# Patient Record
Sex: FEMALE | Race: WHITE | NOT HISPANIC OR LATINO | Employment: FULL TIME | URBAN - NONMETROPOLITAN AREA
[De-identification: names, ages, dates, MRNs, and addresses within clinical notes are randomized per-mention and may not be internally consistent; named-entity substitution may affect disease eponyms.]

---

## 2023-05-12 ENCOUNTER — HOSPITAL ENCOUNTER (EMERGENCY)
Facility: HOSPITAL | Age: 43
Discharge: HOME | End: 2023-05-13
Attending: EMERGENCY MEDICINE
Payer: OTHER GOVERNMENT

## 2023-05-12 DIAGNOSIS — G43.909 MIGRAINE WITHOUT STATUS MIGRAINOSUS, NOT INTRACTABLE, UNSPECIFIED MIGRAINE TYPE: Primary | ICD-10-CM

## 2023-05-12 LAB
ALBUMIN SERPL-MCNC: 4.4 G/DL (ref 3.5–5.2)
ALBUMIN/GLOB SERPL: 1.3 G/DL
ALP SERPL-CCNC: 86 U/L (ref 39–117)
ALT SERPL W P-5'-P-CCNC: 13 U/L (ref 1–33)
ANION GAP SERPL CALCULATED.3IONS-SCNC: 12.8 MMOL/L (ref 5–15)
AST SERPL-CCNC: 18 U/L (ref 1–32)
BASOPHILS # BLD AUTO: 0.05 10*3/MM3 (ref 0–0.2)
BASOPHILS NFR BLD AUTO: 0.7 % (ref 0–1.5)
BILIRUB SERPL-MCNC: 0.2 MG/DL (ref 0–1.2)
BUN SERPL-MCNC: 11 MG/DL (ref 6–20)
BUN/CREAT SERPL: 12.1 (ref 7–25)
CALCIUM SPEC-SCNC: 8.9 MG/DL (ref 8.6–10.5)
CHLORIDE SERPL-SCNC: 101 MMOL/L (ref 98–107)
CO2 SERPL-SCNC: 23.2 MMOL/L (ref 22–29)
CREAT SERPL-MCNC: 0.91 MG/DL (ref 0.57–1)
DEPRECATED RDW RBC AUTO: 44.4 FL (ref 37–54)
EGFRCR SERPLBLD CKD-EPI 2021: 80.9 ML/MIN/1.73
EOSINOPHIL # BLD AUTO: 0.38 10*3/MM3 (ref 0–0.4)
EOSINOPHIL NFR BLD AUTO: 5 % (ref 0.3–6.2)
ERYTHROCYTE [DISTWIDTH] IN BLOOD BY AUTOMATED COUNT: 18.2 % (ref 12.3–15.4)
GLOBULIN UR ELPH-MCNC: 3.4 GM/DL
GLUCOSE SERPL-MCNC: 89 MG/DL (ref 65–99)
HCT VFR BLD AUTO: 35.6 % (ref 34–46.6)
HGB BLD-MCNC: 10.8 G/DL (ref 12–15.9)
HOLD SPECIMEN: NORMAL
HOLD SPECIMEN: NORMAL
IMM GRANULOCYTES # BLD AUTO: 0.02 10*3/MM3 (ref 0–0.05)
IMM GRANULOCYTES NFR BLD AUTO: 0.3 % (ref 0–0.5)
LYMPHOCYTES # BLD AUTO: 2.45 10*3/MM3 (ref 0.7–3.1)
LYMPHOCYTES NFR BLD AUTO: 31.9 % (ref 19.6–45.3)
MCH RBC QN AUTO: 20.7 PG (ref 26.6–33)
MCHC RBC AUTO-ENTMCNC: 30.3 G/DL (ref 31.5–35.7)
MCV RBC AUTO: 68.1 FL (ref 79–97)
MONOCYTES # BLD AUTO: 0.75 10*3/MM3 (ref 0.1–0.9)
MONOCYTES NFR BLD AUTO: 9.8 % (ref 5–12)
NEUTROPHILS NFR BLD AUTO: 4.02 10*3/MM3 (ref 1.7–7)
NEUTROPHILS NFR BLD AUTO: 52.3 % (ref 42.7–76)
NRBC BLD AUTO-RTO: 0 /100 WBC (ref 0–0.2)
PLATELET # BLD AUTO: 335 10*3/MM3 (ref 140–450)
PMV BLD AUTO: 9.6 FL (ref 6–12)
POTASSIUM SERPL-SCNC: 4 MMOL/L (ref 3.5–5.2)
PROT SERPL-MCNC: 7.8 G/DL (ref 6–8.5)
RBC # BLD AUTO: 5.23 10*6/MM3 (ref 3.77–5.28)
SODIUM SERPL-SCNC: 137 MMOL/L (ref 136–145)
WBC NRBC COR # BLD: 7.67 10*3/MM3 (ref 3.4–10.8)
WHOLE BLOOD HOLD COAG: NORMAL
WHOLE BLOOD HOLD SPECIMEN: NORMAL

## 2023-05-12 PROCEDURE — 25010000002 DIPHENHYDRAMINE PER 50 MG: Performed by: PHYSICIAN ASSISTANT

## 2023-05-12 PROCEDURE — 99284 EMERGENCY DEPT VISIT MOD MDM: CPT

## 2023-05-12 PROCEDURE — 80053 COMPREHEN METABOLIC PANEL: CPT | Performed by: PHYSICIAN ASSISTANT

## 2023-05-12 PROCEDURE — 25010000002 METOCLOPRAMIDE PER 10 MG: Performed by: PHYSICIAN ASSISTANT

## 2023-05-12 PROCEDURE — 85025 COMPLETE CBC W/AUTO DIFF WBC: CPT | Performed by: PHYSICIAN ASSISTANT

## 2023-05-12 PROCEDURE — 25010000002 KETOROLAC TROMETHAMINE PER 15 MG: Performed by: PHYSICIAN ASSISTANT

## 2023-05-12 PROCEDURE — 96374 THER/PROPH/DIAG INJ IV PUSH: CPT

## 2023-05-12 PROCEDURE — 96375 TX/PRO/DX INJ NEW DRUG ADDON: CPT

## 2023-05-12 RX ORDER — KETOROLAC TROMETHAMINE 30 MG/ML
15 INJECTION, SOLUTION INTRAMUSCULAR; INTRAVENOUS ONCE
Status: COMPLETED | OUTPATIENT
Start: 2023-05-12 | End: 2023-05-12

## 2023-05-12 RX ORDER — DIPHENHYDRAMINE HYDROCHLORIDE 50 MG/ML
25 INJECTION INTRAMUSCULAR; INTRAVENOUS ONCE
Status: COMPLETED | OUTPATIENT
Start: 2023-05-12 | End: 2023-05-12

## 2023-05-12 RX ORDER — SODIUM CHLORIDE 0.9 % (FLUSH) 0.9 %
10 SYRINGE (ML) INJECTION AS NEEDED
Status: DISCONTINUED | OUTPATIENT
Start: 2023-05-12 | End: 2023-05-13 | Stop reason: HOSPADM

## 2023-05-12 RX ORDER — METOCLOPRAMIDE HYDROCHLORIDE 5 MG/ML
10 INJECTION INTRAMUSCULAR; INTRAVENOUS ONCE
Status: COMPLETED | OUTPATIENT
Start: 2023-05-12 | End: 2023-05-12

## 2023-05-12 RX ORDER — OMEPRAZOLE 20 MG/1
20 CAPSULE, DELAYED RELEASE ORAL DAILY
COMMUNITY

## 2023-05-12 RX ADMIN — METOCLOPRAMIDE 10 MG: 5 INJECTION, SOLUTION INTRAMUSCULAR; INTRAVENOUS at 22:42

## 2023-05-12 RX ADMIN — SODIUM CHLORIDE 1000 ML: 9 INJECTION, SOLUTION INTRAVENOUS at 22:42

## 2023-05-12 RX ADMIN — KETOROLAC TROMETHAMINE 15 MG: 30 INJECTION, SOLUTION INTRAMUSCULAR; INTRAVENOUS at 22:42

## 2023-05-12 RX ADMIN — DIPHENHYDRAMINE HYDROCHLORIDE 25 MG: 50 INJECTION, SOLUTION INTRAMUSCULAR; INTRAVENOUS at 22:42

## 2023-05-13 VITALS
TEMPERATURE: 98.3 F | SYSTOLIC BLOOD PRESSURE: 120 MMHG | HEART RATE: 74 BPM | WEIGHT: 150 LBS | OXYGEN SATURATION: 100 % | HEIGHT: 63 IN | DIASTOLIC BLOOD PRESSURE: 80 MMHG | RESPIRATION RATE: 18 BRPM | BODY MASS INDEX: 26.58 KG/M2

## 2023-05-13 NOTE — ED PROVIDER NOTES
"Subjective:  Chief Complaint:  Headache    History of Present Illness:  Patient is a 42-year-old female with history of migraines who was recently started on Imitrex presenting to the ER with complaints of a migraine that started around 6:00 tonight while driving to work.  She states it started out with her left eye hurting and then spread to the entire left side of her head.  She states she thought it was one of her migraines so she took Imitrex but states that she felt shaky and nauseous afterwards and it did not help her headache.  She denies any additional medications prior to arrival.  She denies any additional symptoms or complaints at this time.  Patient is adamant that she is not pregnant.      Nurses Notes reviewed and agree, including vitals, allergies, social history and prior medical history.     REVIEW OF SYSTEMS: All systems reviewed and not pertinent unless noted.  Review of Systems   Gastrointestinal: Positive for nausea.   Neurological: Positive for headaches.   All other systems reviewed and are negative.      Past Medical History:   Diagnosis Date   • GERD (gastroesophageal reflux disease)    • Hx of blood clots        Allergies:    Patient has no known allergies.      History reviewed. No pertinent surgical history.      Social History     Socioeconomic History   • Marital status:    Tobacco Use   • Smoking status: Former     Types: Cigarettes   • Smokeless tobacco: Never   Vaping Use   • Vaping Use: Never used   Substance and Sexual Activity   • Drug use: Never   • Sexual activity: Defer         History reviewed. No pertinent family history.    Objective  Physical Exam:  /73   Pulse 71   Temp 98.3 °F (36.8 °C)   Resp 18   Ht 160 cm (63\")   Wt 68 kg (150 lb)   SpO2 98%   BMI 26.57 kg/m²      Physical Exam  Vitals and nursing note reviewed.   Constitutional:       General: She is not in acute distress.     Appearance: She is not toxic-appearing.   HENT:      Head: Normocephalic " and atraumatic.      Right Ear: External ear normal.      Left Ear: External ear normal.      Nose: Nose normal.   Eyes:      Extraocular Movements: Extraocular movements intact.      Conjunctiva/sclera: Conjunctivae normal.      Pupils: Pupils are equal, round, and reactive to light.   Cardiovascular:      Rate and Rhythm: Normal rate.   Pulmonary:      Effort: Pulmonary effort is normal. No respiratory distress.   Abdominal:      General: There is no distension.      Palpations: Abdomen is soft.   Musculoskeletal:         General: Normal range of motion.      Cervical back: Normal range of motion and neck supple.   Skin:     General: Skin is warm and dry.   Neurological:      General: No focal deficit present.      Mental Status: She is alert and oriented to person, place, and time.   Psychiatric:         Mood and Affect: Mood normal.         Behavior: Behavior normal.         Procedures    ED Course:         Lab Results (last 24 hours)     Procedure Component Value Units Date/Time    CBC & Differential [[id number]]  (Abnormal) Collected: 05/12/23 2157    Specimen: Blood Updated: 05/12/23 2231    Narrative:      The following orders were created for panel order CBC & Differential.  Procedure                               Abnormality         Status                     ---------                               -----------         ------                     CBC Auto Differential[[id number]]        Abnormal            Final result                 Please view results for these tests on the individual orders.    Comprehensive Metabolic Panel [[id number]] Collected: 05/12/23 2157    Specimen: Blood Updated: 05/12/23 2234     Glucose 89 mg/dL      BUN 11 mg/dL      Creatinine 0.91 mg/dL      Sodium 137 mmol/L      Potassium 4.0 mmol/L      Chloride 101 mmol/L      CO2 23.2 mmol/L      Calcium 8.9 mg/dL      Total Protein 7.8 g/dL      Albumin 4.4 g/dL      ALT (SGPT) 13 U/L      AST (SGOT) 18 U/L      Alkaline Phosphatase 86  U/L      Total Bilirubin 0.2 mg/dL      Globulin 3.4 gm/dL      A/G Ratio 1.3 g/dL      BUN/Creatinine Ratio 12.1     Anion Gap 12.8 mmol/L      eGFR 80.9 mL/min/1.73     Narrative:      GFR Normal >60  Chronic Kidney Disease <60  Kidney Failure <15      CBC Auto Differential [[id number]]  (Abnormal) Collected: 05/12/23 2157    Specimen: Blood Updated: 05/12/23 2231     WBC 7.67 10*3/mm3      RBC 5.23 10*6/mm3      Hemoglobin 10.8 g/dL      Hematocrit 35.6 %      MCV 68.1 fL      MCH 20.7 pg      MCHC 30.3 g/dL      RDW 18.2 %      RDW-SD 44.4 fl      MPV 9.6 fL      Platelets 335 10*3/mm3      Neutrophil % 52.3 %      Lymphocyte % 31.9 %      Monocyte % 9.8 %      Eosinophil % 5.0 %      Basophil % 0.7 %      Immature Grans % 0.3 %      Neutrophils, Absolute 4.02 10*3/mm3      Lymphocytes, Absolute 2.45 10*3/mm3      Monocytes, Absolute 0.75 10*3/mm3      Eosinophils, Absolute 0.38 10*3/mm3      Basophils, Absolute 0.05 10*3/mm3      Immature Grans, Absolute 0.02 10*3/mm3      nRBC 0.0 /100 WBC            No radiology results from the last 24 hrs       MDM  Patient was evaluated in the ER for left-sided headache that started around 6:00 tonight while she was driving.  She is hemodynamically stable, afebrile, nontoxic-appearing on exam.  Differential diagnosis includes but is not limited to tension headache, migraine, cluster headache, among others.  Initial plan includes CBC, CMP, treatment with a migraine cocktail including Reglan, Benadryl, IV fluids, and Toradol.  Patient is adamant that she is not pregnant.  Patient does have history of migraines and was recently started on Imitrex.  She took a dose of this today but states that it made her symptoms worse as it made her feel shaky and nauseous and did not relieve her headache.    Labs are unremarkable.  Patient feels much better after migraine cocktail.  She states she is just sleepy but headache is gone.  She is agreeable with plan for discharge.  She was given  follow-up information for neurology for further outpatient evaluation and definitive care of migraines.  She was advised to follow-up with her PCP as needed.  Precautions were given for return to the ER for any new or worsening symptoms.    Final diagnoses:   Migraine without status migrainosus, not intractable, unspecified migraine type        Geeta Moon PA-C  05/13/23 0010

## 2023-05-13 NOTE — DISCHARGE INSTRUCTIONS
Take Excedrin Migraine as directed on the package as needed for headache/migraine.  Drink plenty of water.  Follow-up with your PCP for further outpatient evaluation as needed.  Follow-up with neurology for further outpatient evaluation and definitive care of migraines.  Return to the ER for any new or worsening symptoms or acute concerns.

## 2023-10-19 ENCOUNTER — OFFICE VISIT (OUTPATIENT)
Dept: OBSTETRICS AND GYNECOLOGY | Facility: CLINIC | Age: 43
End: 2023-10-19
Payer: OTHER GOVERNMENT

## 2023-10-19 VITALS
DIASTOLIC BLOOD PRESSURE: 82 MMHG | BODY MASS INDEX: 30.02 KG/M2 | SYSTOLIC BLOOD PRESSURE: 120 MMHG | WEIGHT: 169.4 LBS | HEIGHT: 63 IN

## 2023-10-19 DIAGNOSIS — N94.6 DYSMENORRHEA: Primary | ICD-10-CM

## 2023-10-19 DIAGNOSIS — R10.84 GENERALIZED ABDOMINAL PAIN: ICD-10-CM

## 2023-10-19 DIAGNOSIS — Z97.5 IUD (INTRAUTERINE DEVICE) IN PLACE: ICD-10-CM

## 2023-10-19 RX ORDER — PANTOPRAZOLE SODIUM 40 MG/1
40 TABLET, DELAYED RELEASE ORAL DAILY
COMMUNITY

## 2023-10-19 RX ORDER — SUMATRIPTAN 100 MG/1
100 TABLET, FILM COATED ORAL
COMMUNITY

## 2023-10-19 RX ORDER — CHOLECALCIFEROL (VITAMIN D3) 125 MCG
10 CAPSULE ORAL NIGHTLY
COMMUNITY

## 2023-10-19 RX ORDER — SUCRALFATE 1 G/1
1 TABLET ORAL 4 TIMES DAILY
COMMUNITY
Start: 2023-08-17 | End: 2024-08-16

## 2023-10-19 RX ORDER — FREMANEZUMAB-VFRM 225 MG/1.5ML
225 INJECTION SUBCUTANEOUS
COMMUNITY

## 2023-10-19 RX ORDER — FAMOTIDINE 20 MG/1
20 TABLET, FILM COATED ORAL
COMMUNITY
Start: 2023-08-17

## 2023-10-19 NOTE — PROGRESS NOTES
"GYN Office Visit    Subjective   Chief Complaint   Patient presents with    Abdominal Pain     New Patient here for evaluation of pelvic and perineal pain. TVS done today     Mary Ellen Guzman is a 43 y.o.  presenting to be evaluated for abdominal pain. She reports this has been ongoing for \"years.\" She recently saw a new PCP in 2023 for this concern. At that time, she reported \"dull throbbing ache, all over abdomen, constant.\" She also reported a history of suspected IBS-C. She has approximately 3 bowel movements per week. She also reported a longstanding history of GERD. She was started on pepcid and carafate and referred to GI for EGD/ colonoscopy, which she has scheduled for next month.    Mary Ellen reports her pain is affected by bowel movements - she is chronically constipated. She does report some pelvic cramping with menses. She has to stay home at times due to the pain - it can go into her legs and throughout her abdomen. She does have a copper IUD in place - it was placed in approximately  in [place]. She had the copper IUD placed due to malposition of a spiral intrauterine device (also placed in [place]). She does report having had a Mirena IUD previously that had to removed under anesthesia, though she is not sure why.     Prior to her IUD, her periods lasted 7-9 days and were much heavier. Now, they last 4-5 days and require changing pads q4 hours. They occur q24-26 days.     OB Hx:   OB History    Para Term  AB Living   5 5 5     5   SAB IAB Ectopic Molar Multiple Live Births             5      # Outcome Date GA Lbr Ramy/2nd Weight Sex Delivery Anes PTL Lv   5 Term      Vag-Spont   WILBERT   4 Term    4252 g (9 lb 6 oz)  Vag-Spont   WILBERT   3 Term      Vag-Spont   WILBERT   2 Term      Vag-Spont   WILBERT   1 Term      Vag-Spont   WILBERT      Obstetric Comments   Largest  4252g/ 9lb6oz      Pap smear: within the past month, not sure of results yet ([facility])  Mammogram: within the " "last year, normal  Colonoscopy: has had a couple, not sure of results. 10-20 years ago.  DEXA Scan: N/A    Past Medical History:   Diagnosis Date    Anemia     Anxiety     Asthma     Cirrhosis     Clotting disorder     Deep vein thrombosis     Depression     Disease of thyroid gland     Endometriosis     GERD (gastroesophageal reflux disease)     Hx of blood clots     Hyperlipidemia     Migraine     Ovarian cyst     Urinary tract infection      Past Surgical History:   Procedure Laterality Date    ABDOMINOPLASTY       Family History   Problem Relation Age of Onset    Hyperlipidemia Maternal Grandmother     Hyperlipidemia Maternal Grandfather         Not sure      Social History     Tobacco Use    Smoking status: Never    Smokeless tobacco: Never   Vaping Use    Vaping Use: Never used   Substance Use Topics    Alcohol use: Never    Drug use: Never     No Known Allergies    Current Outpatient Medications on File Prior to Visit   Medication Sig Dispense Refill    Ajovy 225 MG/1.5ML solution auto-injector Inject 225 mg under the skin into the appropriate area as directed Every 28 (Twenty-Eight) Days.      famotidine (PEPCID) 20 MG tablet Take 1 tablet by mouth.      pantoprazole (PROTONIX) 40 MG EC tablet Take 1 tablet by mouth Daily.      sucralfate (CARAFATE) 1 g tablet Take 1 tablet by mouth 4 (Four) Times a Day.      SUMAtriptan (IMITREX) 100 MG tablet Take 1 tablet by mouth.      melatonin 5 MG tablet tablet Take 2 tablets by mouth Every Night.      PARAGARD INTRAUTERINE COPPER IU 1 each by Intrauterine route 1 (One) Time.      [DISCONTINUED] omeprazole (priLOSEC) 20 MG capsule Take 1 capsule by mouth Daily. (Patient not taking: Reported on 10/19/2023)       No current facility-administered medications on file prior to visit.     Social History    Tobacco Use      Smoking status: Never      Smokeless tobacco: Never       Objective   /82   Ht 160 cm (63\")   Wt 76.8 kg (169 lb 6.4 oz)   LMP 09/25/2023 (Exact " Date)   BMI 30.01 kg/m²     Physical Exam:  General Appearance: alert, interactive, and NAD     Medical Decision Making:    I reviewed Mary Ellen's visit with her PCP, Dr. Rajan, on 8/17/23. I reviewed CT A/P results from 8/17/23. I reviewed, CBC, CMP, TSH, lipase, amylase, UA, lipid panel and A1c results from 8/17/23.    Independent interpretation of tests:  TVUS performed today -   Anteverted uterus measuring 10.2 x 7.8 x 6.4 cm without any masses seen. An intrauterine device is seen within the endometrial cavity and appears to be appropriately positioned. The endometrium is thickened, measuring up to 17 mm. The right ovary is normal in appearance. The left ovary contains a 1.7 cm hypoechoic cyst that appears consistent with an involuting follicle. The bilateral ovaries have normal vascular flow. No adnexal masses seen. No free fluid in the cul de sac.     Assessment & Plan      Diagnosis Plan   1. Dysmenorrhea  US Non-ob Transvaginal      2. IUD (intrauterine device) in place        3. Generalized abdominal pain           Medication Management: We discussed that the copper IUD can cause heavier/ more painful menses and that one management option to consider would be to remove the Paragard and replace with a progestin-based IUD.    Procedures Performed: None    We reviewed Mary Ellen's symptoms and ultrasound results. Her generalized abdominal pain does not seem GYN in origin but rather GI, and I encouraged continued follow up/ completion of upper and lower scopes per her PCP/ GI. She does have a history of dysmenorrhea and AUB, the latter of which is apparently worsened when she does not have any contraception on board. She is a little unsure of her menstrual/ contraceptive history but does seem to recall having much heavier, similarly painful periods prior to her current cycles. We discussed that her endometrium is thickened to 17 mm, which is not typical when progestin-based IUDs are used. We reviewed options to  remove the copper IUD and transition to a progestin-based IUD, or to consider endometrial ablation with the Cerene. Other management options include observation, alternative hormonal contraceptives, or hysterectomy. Mary Ellen would like to proceed with ablation via the Cerene. Prep for case placed and all questions answered.    RTC for post-operative assessment.    Kavitha Villarreal MD  Obstetrics and Gynecology  [facility]

## 2024-03-10 ENCOUNTER — HOSPITAL ENCOUNTER (EMERGENCY)
Facility: HOSPITAL | Age: 44
Discharge: HOME | End: 2024-03-10
Attending: STUDENT IN AN ORGANIZED HEALTH CARE EDUCATION/TRAINING PROGRAM | Admitting: STUDENT IN AN ORGANIZED HEALTH CARE EDUCATION/TRAINING PROGRAM
Payer: OTHER GOVERNMENT

## 2024-03-10 ENCOUNTER — APPOINTMENT (OUTPATIENT)
Dept: CT IMAGING | Facility: HOSPITAL | Age: 44
End: 2024-03-10
Payer: OTHER GOVERNMENT

## 2024-03-10 ENCOUNTER — APPOINTMENT (OUTPATIENT)
Dept: GENERAL RADIOLOGY | Facility: HOSPITAL | Age: 44
End: 2024-03-10
Payer: OTHER GOVERNMENT

## 2024-03-10 VITALS
HEIGHT: 63 IN | WEIGHT: 170 LBS | TEMPERATURE: 98.4 F | RESPIRATION RATE: 18 BRPM | SYSTOLIC BLOOD PRESSURE: 122 MMHG | OXYGEN SATURATION: 98 % | HEART RATE: 78 BPM | BODY MASS INDEX: 30.12 KG/M2 | DIASTOLIC BLOOD PRESSURE: 69 MMHG

## 2024-03-10 DIAGNOSIS — G43.809 OTHER MIGRAINE WITHOUT STATUS MIGRAINOSUS, NOT INTRACTABLE: Primary | ICD-10-CM

## 2024-03-10 LAB
ALBUMIN SERPL-MCNC: 4.6 G/DL (ref 3.5–5.2)
ALBUMIN/GLOB SERPL: 1.4 G/DL
ALP SERPL-CCNC: 87 U/L (ref 39–117)
ALT SERPL W P-5'-P-CCNC: 17 U/L (ref 1–33)
ANION GAP SERPL CALCULATED.3IONS-SCNC: 10.6 MMOL/L (ref 5–15)
AST SERPL-CCNC: 16 U/L (ref 1–32)
BASOPHILS # BLD AUTO: 0.04 10*3/MM3 (ref 0–0.2)
BASOPHILS NFR BLD AUTO: 0.5 % (ref 0–1.5)
BILIRUB SERPL-MCNC: 0.3 MG/DL (ref 0–1.2)
BUN SERPL-MCNC: 12 MG/DL (ref 6–20)
BUN/CREAT SERPL: 14.8 (ref 7–25)
CALCIUM SPEC-SCNC: 9.5 MG/DL (ref 8.6–10.5)
CHLORIDE SERPL-SCNC: 104 MMOL/L (ref 98–107)
CO2 SERPL-SCNC: 24.4 MMOL/L (ref 22–29)
CREAT SERPL-MCNC: 0.81 MG/DL (ref 0.57–1)
DEPRECATED RDW RBC AUTO: 39 FL (ref 37–54)
EGFRCR SERPLBLD CKD-EPI 2021: 92.5 ML/MIN/1.73
EOSINOPHIL # BLD AUTO: 0.31 10*3/MM3 (ref 0–0.4)
EOSINOPHIL NFR BLD AUTO: 3.6 % (ref 0.3–6.2)
ERYTHROCYTE [DISTWIDTH] IN BLOOD BY AUTOMATED COUNT: 13 % (ref 12.3–15.4)
GLOBULIN UR ELPH-MCNC: 3.4 GM/DL
GLUCOSE SERPL-MCNC: 87 MG/DL (ref 65–99)
HCT VFR BLD AUTO: 42.5 % (ref 34–46.6)
HGB BLD-MCNC: 14.7 G/DL (ref 12–15.9)
HOLD SPECIMEN: NORMAL
HOLD SPECIMEN: NORMAL
IMM GRANULOCYTES # BLD AUTO: 0.03 10*3/MM3 (ref 0–0.05)
IMM GRANULOCYTES NFR BLD AUTO: 0.3 % (ref 0–0.5)
LYMPHOCYTES # BLD AUTO: 2.11 10*3/MM3 (ref 0.7–3.1)
LYMPHOCYTES NFR BLD AUTO: 24.5 % (ref 19.6–45.3)
MCH RBC QN AUTO: 28.5 PG (ref 26.6–33)
MCHC RBC AUTO-ENTMCNC: 34.6 G/DL (ref 31.5–35.7)
MCV RBC AUTO: 82.5 FL (ref 79–97)
MONOCYTES # BLD AUTO: 0.66 10*3/MM3 (ref 0.1–0.9)
MONOCYTES NFR BLD AUTO: 7.7 % (ref 5–12)
NEUTROPHILS NFR BLD AUTO: 5.46 10*3/MM3 (ref 1.7–7)
NEUTROPHILS NFR BLD AUTO: 63.4 % (ref 42.7–76)
NRBC BLD AUTO-RTO: 0 /100 WBC (ref 0–0.2)
PLATELET # BLD AUTO: 266 10*3/MM3 (ref 140–450)
PMV BLD AUTO: 9.5 FL (ref 6–12)
POTASSIUM SERPL-SCNC: 4.1 MMOL/L (ref 3.5–5.2)
PROT SERPL-MCNC: 8 G/DL (ref 6–8.5)
RBC # BLD AUTO: 5.15 10*6/MM3 (ref 3.77–5.28)
SODIUM SERPL-SCNC: 139 MMOL/L (ref 136–145)
WBC NRBC COR # BLD AUTO: 8.61 10*3/MM3 (ref 3.4–10.8)
WHOLE BLOOD HOLD COAG: NORMAL
WHOLE BLOOD HOLD SPECIMEN: NORMAL

## 2024-03-10 PROCEDURE — 70450 CT HEAD/BRAIN W/O DYE: CPT

## 2024-03-10 PROCEDURE — 85025 COMPLETE CBC W/AUTO DIFF WBC: CPT

## 2024-03-10 PROCEDURE — 25010000002 KETOROLAC TROMETHAMINE PER 15 MG: Performed by: NURSE PRACTITIONER

## 2024-03-10 PROCEDURE — 80053 COMPREHEN METABOLIC PANEL: CPT

## 2024-03-10 PROCEDURE — 93005 ELECTROCARDIOGRAM TRACING: CPT

## 2024-03-10 PROCEDURE — 25010000002 MAGNESIUM SULFATE 2 GM/50ML SOLUTION: Performed by: NURSE PRACTITIONER

## 2024-03-10 PROCEDURE — 25010000002 DEXAMETHASONE SODIUM PHOSPHATE 10 MG/ML SOLUTION: Performed by: NURSE PRACTITIONER

## 2024-03-10 PROCEDURE — 25010000002 PROCHLORPERAZINE 10 MG/2ML SOLUTION: Performed by: NURSE PRACTITIONER

## 2024-03-10 PROCEDURE — 25810000003 SODIUM CHLORIDE 0.9 % SOLUTION: Performed by: NURSE PRACTITIONER

## 2024-03-10 PROCEDURE — 96365 THER/PROPH/DIAG IV INF INIT: CPT

## 2024-03-10 PROCEDURE — 96375 TX/PRO/DX INJ NEW DRUG ADDON: CPT

## 2024-03-10 PROCEDURE — 99284 EMERGENCY DEPT VISIT MOD MDM: CPT

## 2024-03-10 PROCEDURE — 71045 X-RAY EXAM CHEST 1 VIEW: CPT

## 2024-03-10 RX ORDER — KETOROLAC TROMETHAMINE 30 MG/ML
30 INJECTION, SOLUTION INTRAMUSCULAR; INTRAVENOUS ONCE
Status: COMPLETED | OUTPATIENT
Start: 2024-03-10 | End: 2024-03-10

## 2024-03-10 RX ORDER — DEXAMETHASONE SODIUM PHOSPHATE 10 MG/ML
10 INJECTION, SOLUTION INTRAMUSCULAR; INTRAVENOUS ONCE
Status: COMPLETED | OUTPATIENT
Start: 2024-03-10 | End: 2024-03-10

## 2024-03-10 RX ORDER — PROCHLORPERAZINE EDISYLATE 5 MG/ML
10 INJECTION INTRAMUSCULAR; INTRAVENOUS EVERY 6 HOURS PRN
Status: DISCONTINUED | OUTPATIENT
Start: 2024-03-10 | End: 2024-03-10 | Stop reason: HOSPADM

## 2024-03-10 RX ORDER — SODIUM CHLORIDE 0.9 % (FLUSH) 0.9 %
10 SYRINGE (ML) INJECTION AS NEEDED
Status: DISCONTINUED | OUTPATIENT
Start: 2024-03-10 | End: 2024-03-10 | Stop reason: HOSPADM

## 2024-03-10 RX ORDER — MAGNESIUM SULFATE HEPTAHYDRATE 40 MG/ML
2 INJECTION, SOLUTION INTRAVENOUS ONCE
Status: COMPLETED | OUTPATIENT
Start: 2024-03-10 | End: 2024-03-10

## 2024-03-10 RX ADMIN — KETOROLAC TROMETHAMINE 30 MG: 30 INJECTION, SOLUTION INTRAMUSCULAR; INTRAVENOUS at 12:48

## 2024-03-10 RX ADMIN — SODIUM CHLORIDE 1000 ML: 9 INJECTION, SOLUTION INTRAVENOUS at 12:47

## 2024-03-10 RX ADMIN — MAGNESIUM SULFATE HEPTAHYDRATE 2 G: 40 INJECTION, SOLUTION INTRAVENOUS at 12:48

## 2024-03-10 RX ADMIN — DEXAMETHASONE SODIUM PHOSPHATE 10 MG: 10 INJECTION INTRAMUSCULAR; INTRAVENOUS at 12:47

## 2024-03-10 RX ADMIN — PROCHLORPERAZINE EDISYLATE 10 MG: 5 INJECTION INTRAMUSCULAR; INTRAVENOUS at 13:04

## 2024-03-10 NOTE — ED PROVIDER NOTES
"Subjective:  History of Present Illness:    Patient is a 43-year-old female with history of migraine.  Presents to the ER today for bilateral upper and lower extremity weakness and blurry vision (with flashing lights) x 1 hour PTA.  She denies shortness of breath or chest pain.  She denies OTC medication home remedy.  Denies alleviating or exacerbating factors.    Nurses Notes reviewed and agree, including vitals, allergies, social history and prior medical history.     REVIEW OF SYSTEMS: All systems reviewed and not pertinent unless noted.  Review of Systems   Musculoskeletal:         Bilateral lower extremity weakness   Neurological:  Positive for headaches.   All other systems reviewed and are negative.      Past Medical History:   Diagnosis Date    Anemia     Anxiety     Asthma     Cirrhosis     Clotting disorder     Deep vein thrombosis     Depression     Disease of thyroid gland     Endometriosis     GERD (gastroesophageal reflux disease)     Hx of blood clots     Hyperlipidemia     Migraine     Ovarian cyst     Urinary tract infection        Allergies:    Patient has no known allergies.      Past Surgical History:   Procedure Laterality Date    ABDOMINOPLASTY           Social History     Socioeconomic History    Marital status:    Tobacco Use    Smoking status: Never    Smokeless tobacco: Never   Vaping Use    Vaping status: Never Used   Substance and Sexual Activity    Alcohol use: Never    Drug use: Never    Sexual activity: Not Currently     Partners: Male     Birth control/protection: I.U.D.         Family History   Problem Relation Age of Onset    Hyperlipidemia Maternal Grandmother     Hyperlipidemia Maternal Grandfather         Not sure       Objective  Physical Exam:  /69   Pulse 78   Temp 98.4 °F (36.9 °C) (Oral)   Resp 18   Ht 160 cm (63\")   Wt 77.1 kg (170 lb)   LMP  (LMP Unknown)   SpO2 98%   BMI 30.11 kg/m²      Physical Exam  Vitals and nursing note reviewed. "   Constitutional:       Appearance: Normal appearance. She is normal weight.   HENT:      Head: Normocephalic and atraumatic.      Nose: Nose normal.      Mouth/Throat:      Mouth: Mucous membranes are moist.      Pharynx: Oropharynx is clear.   Eyes:      Extraocular Movements: Extraocular movements intact.      Conjunctiva/sclera: Conjunctivae normal.      Pupils: Pupils are equal, round, and reactive to light.   Cardiovascular:      Rate and Rhythm: Normal rate and regular rhythm.   Pulmonary:      Effort: Pulmonary effort is normal.      Breath sounds: Normal breath sounds.   Abdominal:      General: Abdomen is flat. Bowel sounds are normal.      Palpations: Abdomen is soft.   Musculoskeletal:         General: Normal range of motion.      Cervical back: Normal range of motion and neck supple.   Skin:     General: Skin is warm.      Capillary Refill: Capillary refill takes less than 2 seconds.   Neurological:      General: No focal deficit present.      Mental Status: She is alert and oriented to person, place, and time. Mental status is at baseline.   Psychiatric:         Mood and Affect: Mood normal.         Behavior: Behavior normal.         Thought Content: Thought content normal.         Judgment: Judgment normal.         Procedures    ED Course:    ED Course as of 03/10/24 1420   Sun Mar 10, 2024   1254 EKG interpreted by me, normal sinus rhythm no concerning ST changes noted, rate of 85 [JE]      ED Course User Index  [JE] Quoc Fagan MD       Lab Results (last 24 hours)       Procedure Component Value Units Date/Time    CBC & Differential [[id number]]  (Normal) Collected: 03/10/24 1211    Specimen: Blood Updated: 03/10/24 1222    Narrative:      The following orders were created for panel order CBC & Differential.  Procedure                               Abnormality         Status                     ---------                               -----------         ------                     CBC Auto  Differential[[id number]]        Normal              Final result                 Please view results for these tests on the individual orders.    Comprehensive Metabolic Panel [[id number]] Collected: 03/10/24 1211    Specimen: Blood Updated: 03/10/24 1236     Glucose 87 mg/dL      BUN 12 mg/dL      Creatinine 0.81 mg/dL      Sodium 139 mmol/L      Potassium 4.1 mmol/L      Chloride 104 mmol/L      CO2 24.4 mmol/L      Calcium 9.5 mg/dL      Total Protein 8.0 g/dL      Albumin 4.6 g/dL      ALT (SGPT) 17 U/L      AST (SGOT) 16 U/L      Alkaline Phosphatase 87 U/L      Total Bilirubin 0.3 mg/dL      Globulin 3.4 gm/dL      A/G Ratio 1.4 g/dL      BUN/Creatinine Ratio 14.8     Anion Gap 10.6 mmol/L      eGFR 92.5 mL/min/1.73     Narrative:      GFR Normal >60  Chronic Kidney Disease <60  Kidney Failure <15      CBC Auto Differential [[id number]]  (Normal) Collected: 03/10/24 1211    Specimen: Blood Updated: 03/10/24 1222     WBC 8.61 10*3/mm3      RBC 5.15 10*6/mm3      Hemoglobin 14.7 g/dL      Hematocrit 42.5 %      MCV 82.5 fL      MCH 28.5 pg      MCHC 34.6 g/dL      RDW 13.0 %      RDW-SD 39.0 fl      MPV 9.5 fL      Platelets 266 10*3/mm3      Neutrophil % 63.4 %      Lymphocyte % 24.5 %      Monocyte % 7.7 %      Eosinophil % 3.6 %      Basophil % 0.5 %      Immature Grans % 0.3 %      Neutrophils, Absolute 5.46 10*3/mm3      Lymphocytes, Absolute 2.11 10*3/mm3      Monocytes, Absolute 0.66 10*3/mm3      Eosinophils, Absolute 0.31 10*3/mm3      Basophils, Absolute 0.04 10*3/mm3      Immature Grans, Absolute 0.03 10*3/mm3      nRBC 0.0 /100 WBC              XR Chest 1 View    Result Date: 3/10/2024  PROCEDURE: XR CHEST 1 VW-  HISTORY: Generalized weakness   COMPARISON: None.  FINDINGS:  The heart size is normal. The mediastinum is normal. No acute pulmonary abnormality is identified. There is no pneumothorax. The bony thorax in intact.      Impression: No acute cardiopulmonary process.    This report was signed and  finalized on 3/10/2024 2:03 PM by Brian Pal MD.      CT Head Without Contrast    Result Date: 3/10/2024  PROCEDURE: CT HEAD WO CONTRAST-  HISTORY: Dizziness, non-specific  COMPARISON: None  TECHNIQUE: Multiple axial CT images were performed from the foramen magnum to the vertex without contrast. Coronal reformatted images were also obtained.This study was performed with techniques to keep radiation doses as low as reasonably achievable, (ALARA). Individualized dose reduction techniques using automated exposure control or adjustment of mA and/or kV according to the patient size were employed.   FINDINGS: The ventricles are normal in size. There is no evidence of hemorrhage .  No masses are identified.  No abnormal extra-axial fluid collection is seen.  There is no evidence of shift of the midline structures. Images of the sinuses reveal no evidence of mucosal thickening. No bony abnormality is seen on the bone window images.      Impression: No acute intracranial abnormality.      CTDI: 37.66 mGy DLP:604.35 mGy.cm   This report was signed and finalized on 3/10/2024 12:42 PM by Brian Pal MD.          MDM      Initial impression of presenting illness: Patient is a 43-year-old female with history of migraine.  Presents to the ER today for bilateral upper and lower extremity weakness and blurry vision (with flashing lights) x 1 hour PTA.  She denies shortness of breath or chest pain.  She denies OTC medication home remedy.  Denies alleviating or exacerbating factors    DDX: includes but is not limited to: CVA, migraine, electrolyte derangement or other    Patient arrives stable with vitals interpreted by myself.     Pertinent features from physical exam: Lung sounds are clear bilaterally throughout.  Abdo soft nontender.  Bowel sounds are normal.  Heart sounds are normal.  Patient has generalized weakness without focal findings.    Initial diagnostic plan: CBC, CMP, chest x-ray, CT head without  contrast    Results from initial plan were reviewed and interpreted by me revealing CBC, CMP, chest x-ray, CT of head without acute findings    Diagnostic information from other sources: Chart review    Interventions / Re-evaluation: Patient received 10 mg dexamethasone, 30 mg of Toradol, 10 mg of Compazine, 1 L normal saline.  2 mg of magnesium sulfate    Results/clinical rationale were discussed with patient    Consultations/Discussion of results with other physicians: N/A    Disposition plan: Patient is hemodynamically stable nontoxic-appearing appropriate for discharge.  Will have patient follow-up PCP in 1 week.  Follow-up ER for new or worsening symptoms.  -----        Final diagnoses:   Other migraine without status migrainosus, not intractable          Mitchel Yancey, APRN  03/10/24 1779